# Patient Record
Sex: FEMALE | Race: WHITE | NOT HISPANIC OR LATINO | Employment: PART TIME | URBAN - METROPOLITAN AREA
[De-identification: names, ages, dates, MRNs, and addresses within clinical notes are randomized per-mention and may not be internally consistent; named-entity substitution may affect disease eponyms.]

---

## 2018-08-10 ENCOUNTER — OFFICE VISIT (OUTPATIENT)
Dept: FAMILY MEDICINE CLINIC | Facility: CLINIC | Age: 33
End: 2018-08-10
Payer: COMMERCIAL

## 2018-08-10 VITALS
WEIGHT: 293 LBS | HEART RATE: 74 BPM | DIASTOLIC BLOOD PRESSURE: 80 MMHG | RESPIRATION RATE: 16 BRPM | HEIGHT: 66 IN | TEMPERATURE: 97.9 F | SYSTOLIC BLOOD PRESSURE: 118 MMHG | BODY MASS INDEX: 47.09 KG/M2

## 2018-08-10 DIAGNOSIS — L91.8 SKIN TAG: ICD-10-CM

## 2018-08-10 DIAGNOSIS — D22.9 ATYPICAL NEVI: Primary | ICD-10-CM

## 2018-08-10 PROCEDURE — 1036F TOBACCO NON-USER: CPT | Performed by: FAMILY MEDICINE

## 2018-08-10 PROCEDURE — 99213 OFFICE O/P EST LOW 20 MIN: CPT | Performed by: FAMILY MEDICINE

## 2018-08-10 PROCEDURE — 3008F BODY MASS INDEX DOCD: CPT | Performed by: FAMILY MEDICINE

## 2018-08-10 NOTE — PROGRESS NOTES
Assessment/Plan     Atypical nevi   Patient reports increase in size of nevi on her right forearm over the past few months,  with irregular margins, uniform color, uniform border, around 4 mm in size, with history of increase in size  Referred to Dermatology for further evaluation  Nevus left forearm : ~5mm in diameter,  Irregular margins, uniform color, patient reports  no change in size    Skin tag   5x5x5 mm protruding lesion in front of left ear, Likely skin tag  Patient wants removal   Patient referred to Dermatology to eval and treat  Diagnoses and all orders for this visit:    Atypical nevi  -     Ambulatory referral to Dermatology; Future    Skin tag  -     Ambulatory referral to Dermatology; Future         Subjective     Chief Complaint   Patient presents with    Nevus         27-year-old female presents to office with complaints of increase in size of mole on her right forearm over the past few months  Patient denies any other symptoms with it  Also has a skin tag in front of her left ear and wants that removed  The following portions of the patient's history were reviewed and updated as appropriate: allergies, current medications, past family history, past medical history, past social history, past surgical history and problem list     Review of Systems   Constitutional: Negative  HENT: Negative  Respiratory: Negative for cough, chest tightness and shortness of breath  Cardiovascular: Negative for chest pain and palpitations  Gastrointestinal: Negative for abdominal distention, diarrhea and nausea  Genitourinary: Negative for difficulty urinating  Skin:        Skin tag   Neurological: Negative for dizziness  Objective     Vitals:Blood pressure 118/80, pulse 74, temperature 97 9 °F (36 6 °C), temperature source Tympanic, resp  rate 16, height 5' 6 3" (1 684 m), weight (!) 146 kg (321 lb 6 4 oz), last menstrual period 07/22/2018    Physical Exam:  Physical Exam Constitutional: She is oriented to person, place, and time  She appears well-developed and well-nourished  HENT:   Head: Normocephalic and atraumatic  Mouth/Throat: Oropharynx is clear and moist    Eyes: Conjunctivae and EOM are normal    Neck: Normal range of motion  Neck supple  Cardiovascular: Normal rate, regular rhythm and normal heart sounds  Exam reveals no friction rub  No murmur heard  Pulmonary/Chest: Effort normal and breath sounds normal  No respiratory distress  She has no wheezes  She has no rales  Abdominal: Soft  Bowel sounds are normal  She exhibits no distension  There is no tenderness  Musculoskeletal: Normal range of motion  Neurological: She is alert and oriented to person, place, and time  Skin: Skin is warm and dry  5x5x5 mm protruding lesion in front of left ear, Likely skin tag  Nevus right forearm :irregular margins, uniform color, uniform border, around 4 mm in size, with history of increase in size       Nevus left forearm : ~5mm in diameter,  Irregular margins, uniform color, patient reports  no change in size

## 2018-08-10 NOTE — ASSESSMENT & PLAN NOTE
Patient reports increase in size of nevi on her right forearm over the past few months,  with irregular margins, uniform color, uniform border, around 4 mm in size, with history of increase in size  Referred to Dermatology for further evaluation      Nevus left forearm : ~5mm in diameter,  Irregular margins, uniform color, patient reports  no change in size

## 2018-08-10 NOTE — ASSESSMENT & PLAN NOTE
5x5x5 mm protruding lesion in front of left ear, Likely skin tag  Patient wants removal   Patient referred to Dermatology to radha and treat

## 2021-12-07 ENCOUNTER — APPOINTMENT (OUTPATIENT)
Dept: LAB | Age: 36
End: 2021-12-07
Payer: COMMERCIAL

## 2021-12-07 ENCOUNTER — OFFICE VISIT (OUTPATIENT)
Dept: FAMILY MEDICINE CLINIC | Facility: CLINIC | Age: 36
End: 2021-12-07

## 2021-12-07 VITALS
RESPIRATION RATE: 18 BRPM | DIASTOLIC BLOOD PRESSURE: 82 MMHG | HEART RATE: 83 BPM | HEIGHT: 65 IN | TEMPERATURE: 98.3 F | WEIGHT: 293 LBS | OXYGEN SATURATION: 98 % | SYSTOLIC BLOOD PRESSURE: 122 MMHG | BODY MASS INDEX: 48.82 KG/M2

## 2021-12-07 DIAGNOSIS — N97.0 INFERTILITY ASSOCIATED WITH ANOVULATION: ICD-10-CM

## 2021-12-07 DIAGNOSIS — E66.01 MORBID OBESITY WITH BMI OF 50.0-59.9, ADULT (HCC): ICD-10-CM

## 2021-12-07 DIAGNOSIS — Z00.00 ANNUAL PHYSICAL EXAM: Primary | ICD-10-CM

## 2021-12-07 LAB
ANION GAP SERPL CALCULATED.3IONS-SCNC: 7 MMOL/L (ref 4–13)
BUN SERPL-MCNC: 10 MG/DL (ref 5–25)
CALCIUM SERPL-MCNC: 9 MG/DL (ref 8.3–10.1)
CHLORIDE SERPL-SCNC: 107 MMOL/L (ref 100–108)
CO2 SERPL-SCNC: 27 MMOL/L (ref 21–32)
CREAT SERPL-MCNC: 0.81 MG/DL (ref 0.6–1.3)
FSH SERPL-ACNC: 6.6 MIU/ML
GFR SERPL CREATININE-BSD FRML MDRD: 94 ML/MIN/1.73SQ M
GLUCOSE P FAST SERPL-MCNC: 95 MG/DL (ref 65–99)
LH SERPL-ACNC: 8 MIU/ML
POTASSIUM SERPL-SCNC: 3.7 MMOL/L (ref 3.5–5.3)
PROLACTIN SERPL-MCNC: 5.1 NG/ML
SODIUM SERPL-SCNC: 141 MMOL/L (ref 136–145)
TSH SERPL DL<=0.05 MIU/L-ACNC: 2.43 UIU/ML (ref 0.36–3.74)

## 2021-12-07 PROCEDURE — 84443 ASSAY THYROID STIM HORMONE: CPT

## 2021-12-07 PROCEDURE — 3725F SCREEN DEPRESSION PERFORMED: CPT | Performed by: FAMILY MEDICINE

## 2021-12-07 PROCEDURE — 84146 ASSAY OF PROLACTIN: CPT

## 2021-12-07 PROCEDURE — 84403 ASSAY OF TOTAL TESTOSTERONE: CPT

## 2021-12-07 PROCEDURE — 84402 ASSAY OF FREE TESTOSTERONE: CPT

## 2021-12-07 PROCEDURE — 99395 PREV VISIT EST AGE 18-39: CPT | Performed by: FAMILY MEDICINE

## 2021-12-07 PROCEDURE — 3008F BODY MASS INDEX DOCD: CPT | Performed by: FAMILY MEDICINE

## 2021-12-07 PROCEDURE — 83001 ASSAY OF GONADOTROPIN (FSH): CPT

## 2021-12-07 PROCEDURE — 36415 COLL VENOUS BLD VENIPUNCTURE: CPT

## 2021-12-07 PROCEDURE — 80048 BASIC METABOLIC PNL TOTAL CA: CPT

## 2021-12-07 PROCEDURE — 99213 OFFICE O/P EST LOW 20 MIN: CPT | Performed by: FAMILY MEDICINE

## 2021-12-07 PROCEDURE — 83002 ASSAY OF GONADOTROPIN (LH): CPT

## 2021-12-07 RX ORDER — MEDROXYPROGESTERONE ACETATE 10 MG/1
10 TABLET ORAL DAILY
Qty: 7 TABLET | Refills: 0 | Status: SHIPPED | OUTPATIENT
Start: 2021-12-07

## 2021-12-08 LAB
TESTOST FREE SERPL-MCNC: 3.5 PG/ML (ref 0–4.2)
TESTOST SERPL-MCNC: 31 NG/DL (ref 8–60)

## 2025-08-05 ENCOUNTER — ANNUAL EXAM (OUTPATIENT)
Dept: OBGYN CLINIC | Facility: CLINIC | Age: 40
End: 2025-08-05
Payer: COMMERCIAL

## 2025-08-05 VITALS — BODY MASS INDEX: 48.33 KG/M2 | DIASTOLIC BLOOD PRESSURE: 80 MMHG | SYSTOLIC BLOOD PRESSURE: 130 MMHG | WEIGHT: 286 LBS

## 2025-08-05 DIAGNOSIS — N90.89 VULVAR IRRITATION: ICD-10-CM

## 2025-08-05 DIAGNOSIS — Z01.419 PAP SMEAR, AS PART OF ROUTINE GYNECOLOGICAL EXAMINATION: ICD-10-CM

## 2025-08-05 DIAGNOSIS — B37.9 YEAST DETECTED: Primary | ICD-10-CM

## 2025-08-05 DIAGNOSIS — N89.8 VAGINAL DISCHARGE: ICD-10-CM

## 2025-08-05 DIAGNOSIS — Z01.419 WOMEN'S ANNUAL ROUTINE GYNECOLOGICAL EXAMINATION: ICD-10-CM

## 2025-08-05 PROCEDURE — 81514 NFCT DS BV&VAGINITIS DNA ALG: CPT | Performed by: OBSTETRICS & GYNECOLOGY

## 2025-08-05 PROCEDURE — G0145 SCR C/V CYTO,THINLAYER,RESCR: HCPCS | Performed by: OBSTETRICS & GYNECOLOGY

## 2025-08-05 PROCEDURE — S0610 ANNUAL GYNECOLOGICAL EXAMINA: HCPCS | Performed by: OBSTETRICS & GYNECOLOGY

## 2025-08-05 PROCEDURE — G0476 HPV COMBO ASSAY CA SCREEN: HCPCS | Performed by: OBSTETRICS & GYNECOLOGY

## 2025-08-05 RX ORDER — NYSTATIN AND TRIAMCINOLONE ACETONIDE 100000; 1 [USP'U]/G; MG/G
OINTMENT TOPICAL 2 TIMES DAILY
Qty: 30 G | Refills: 2 | Status: SHIPPED | OUTPATIENT
Start: 2025-08-05

## 2025-08-07 ENCOUNTER — OFFICE VISIT (OUTPATIENT)
Dept: FAMILY MEDICINE CLINIC | Facility: CLINIC | Age: 40
End: 2025-08-07
Payer: COMMERCIAL

## 2025-08-07 LAB
C GLABRATA DNA VAG QL NAA+PROBE: NEGATIVE
C KRUSEI DNA VAG QL NAA+PROBE: NEGATIVE
CANDIDA SP 6 PNL VAG NAA+PROBE: POSITIVE
HPV HR 12 DNA CVX QL NAA+PROBE: NEGATIVE
HPV16 DNA CVX QL NAA+PROBE: NEGATIVE
HPV18 DNA CVX QL NAA+PROBE: NEGATIVE
T VAGINALIS DNA VAG QL NAA+PROBE: NEGATIVE
VAGINOSIS/ITIS DNA PNL VAG PROBE+SIG AMP: NEGATIVE

## 2025-08-08 ENCOUNTER — OFFICE VISIT (OUTPATIENT)
Dept: FAMILY MEDICINE CLINIC | Facility: CLINIC | Age: 40
End: 2025-08-08
Payer: COMMERCIAL

## 2025-08-08 RX ORDER — FLUCONAZOLE 150 MG/1
150 TABLET ORAL ONCE
Qty: 1 TABLET | Refills: 0 | Status: SHIPPED | OUTPATIENT
Start: 2025-08-08 | End: 2025-08-08

## 2025-08-13 LAB
LAB AP GYN PRIMARY INTERPRETATION: NORMAL
Lab: NORMAL
PATH INTERP SPEC-IMP: NORMAL